# Patient Record
Sex: MALE | Race: WHITE | NOT HISPANIC OR LATINO | Employment: FULL TIME | URBAN - METROPOLITAN AREA
[De-identification: names, ages, dates, MRNs, and addresses within clinical notes are randomized per-mention and may not be internally consistent; named-entity substitution may affect disease eponyms.]

---

## 2021-08-24 ENCOUNTER — OFFICE VISIT (OUTPATIENT)
Dept: URGENT CARE | Facility: URGENT CARE | Age: 31
End: 2021-08-24
Payer: OTHER GOVERNMENT

## 2021-08-24 VITALS
SYSTOLIC BLOOD PRESSURE: 125 MMHG | DIASTOLIC BLOOD PRESSURE: 81 MMHG | TEMPERATURE: 97.6 F | HEART RATE: 88 BPM | OXYGEN SATURATION: 99 %

## 2021-08-24 DIAGNOSIS — T63.481A ALLERGIC REACTION TO INSECT STING, ACCIDENTAL OR UNINTENTIONAL, INITIAL ENCOUNTER: Primary | ICD-10-CM

## 2021-08-24 PROCEDURE — 96372 THER/PROPH/DIAG INJ SC/IM: CPT | Performed by: INTERNAL MEDICINE

## 2021-08-24 PROCEDURE — 99205 OFFICE O/P NEW HI 60 MIN: CPT | Performed by: INTERNAL MEDICINE

## 2021-08-24 RX ORDER — EPINEPHRINE 0.3 MG/.3ML
0.3 INJECTION SUBCUTANEOUS ONCE
Status: COMPLETED | OUTPATIENT
Start: 2021-08-24 | End: 2021-08-24

## 2021-08-24 RX ORDER — EPINEPHRINE 0.3 MG/.3ML
0.3 INJECTION SUBCUTANEOUS ONCE
Qty: 0.3 ML | Refills: 0 | Status: SHIPPED | OUTPATIENT
Start: 2021-08-24 | End: 2021-08-24

## 2021-08-24 RX ORDER — DIPHENHYDRAMINE HCL 25 MG
25 CAPSULE ORAL ONCE
Status: COMPLETED | OUTPATIENT
Start: 2021-08-24 | End: 2021-08-24

## 2021-08-24 RX ADMIN — EPINEPHRINE 0.3 MG: 0.3 INJECTION SUBCUTANEOUS at 17:13

## 2021-08-24 RX ADMIN — Medication 25 MG: at 17:13

## 2021-08-24 NOTE — PROGRESS NOTES
SUBJECTIVE:  Varghese Brothers is an 31 year old male who presents for bee sting.  Not sure exactly what stung him but thinks was a bee or wasp or hornet.  Sting occurred about 30 min ago.  Hurt at site of sting.   Has become red around the area of the sting on the left foot.  Has some swelling around the sting as well.  Feels some tightness in joints for a couple minutes .   Hasn't noticed any rashes on skin other than redness around the sting site.  Feels some itching but no rashes or hives.  No swelling of lips, tongue.  Throat feels tight and harder to swallow.  No nausea.  No meds taken for sxs.  In past has had hives after a sting from bee/hornet/wasp a couple times, maybe more.   No wheezing or shortness of breath.  Does feel that his throat is tight or irritated and it feels a little different or even difficult to swallow.    PMH:  neg    Social History     Socioeconomic History     Marital status:      Spouse name: Not on file     Number of children: Not on file     Years of education: Not on file     Highest education level: Not on file   Occupational History     Not on file   Tobacco Use     Smoking status: Not on file   Substance and Sexual Activity     Alcohol use: Not on file     Drug use: Not on file     Sexual activity: Not on file   Other Topics Concern     Not on file   Social History Narrative     Not on file     Social Determinants of Health     Financial Resource Strain:      Difficulty of Paying Living Expenses:    Food Insecurity:      Worried About Running Out of Food in the Last Year:      Ran Out of Food in the Last Year:    Transportation Needs:      Lack of Transportation (Medical):      Lack of Transportation (Non-Medical):    Physical Activity:      Days of Exercise per Week:      Minutes of Exercise per Session:    Stress:      Feeling of Stress :    Social Connections:      Frequency of Communication with Friends and Family:      Frequency of Social Gatherings with Friends and  Family:      Attends [employer] Services:      Active Member of Clubs or Organizations:      Attends Club or Organization Meetings:      Marital Status:    Intimate Partner Violence:      Fear of Current or Ex-Partner:      Emotionally Abused:      Physically Abused:      Sexually Abused:      No family history on file.    ALLERGIES:  Patient has no known allergies.    No current outpatient medications on file.     No current facility-administered medications for this visit.         ROS:  ROS is done and is negative for general/constitutional, eye, ENT, Respiratory, cardiovascular, GI, , Skin, musculoskeletal except as noted elsewhere.  All other review of systems negative except as noted elsewhere.      OBJECTIVE:  /81   Pulse 72   Temp 97.6  F (36.4  C) (Oral)   SpO2 99%   GENERAL APPEARANCE: Alert, in no acute distress  EYES: normal  EARS: External ears normal. Canals clear. TM's normal.  NOSE:normal  OROPHARYNX:normal, no erythema or edema  NECK:No adenopathy,masses or thyromegaly  RESP: normal and clear to auscultation  CV:regular rate and rhythm and no murmurs, clicks, or gallops  ABDOMEN: Abdomen soft, non-tender. BS normal. No masses, organomegaly  SKIN: left foot - top of foot with irregular area approx 9 cm area of mild erythema and mild edema with pinpoint opening in skin near center, c/w sting  MUSCULOSKELETAL:Musculoskeletal normal      TREATMENT:  Pt received one dose of epi-pen and 25mg oral benadryl at approx 5:15 pm.  Within 15 minutes he noticed improvement in his throat symptoms with decreased sense of irritation.  Monitored over the next 2 hours and his throat sxs fully resolved and swallowing was not troublesome to him any more.  Skin itching improved as well.  After 2 hours of monitoring, no evidence of rebound sxs noted    ASSESSMENT/PLAN:    ASSESSMENT / PLAN:  (T63.460D) Allergic reaction to insect sting, accidental or unintentional, initial encounter  (primary encounter  diagnosis)  Comment: had two systems involved with reaction, so criteria met for anaphylaxis which can become life threatening if not treated.  Pt received epipen here quickly.  pt improved with epi pen and benadryl.  Monitored him for 2 hours and I frequently checked on him during this time.  No rebound sxs detected.  Plan: EPINEPHrine (ANY BX GENERIC EQUIV) injection         0.3 mg, diphenhydrAMINE (BENADRYL) capsule 25         mg, EPINEPHrine (ANY BX GENERIC EQUIV) 0.3         MG/0.3ML injection 2-pack        Pt sent home with epipen rx and has been instructed on when and how to use if for sxs.  If uses it is to go to ER, whether has rebound sxs tonight or future sxs from stings.  Reviewed medication instructions and side effects. Follow up if experiences side effects.. I reviewed supportive care, otc meds to use if needed, expected course, and signs of concern.  Follow up as needed or if he does not improve within 1 day(s) or if worsens in any way.  Reviewed red flag symptoms and is to go to the ER if experiences any of these.      PPE worn: mask and shield.  Total time I spent with pt and monitoring pt and reviewing records was 80 minutes  See [facility] for orders, medications, letters, patient instructions    Araceli Coker M.D.

## 2021-10-17 ENCOUNTER — HEALTH MAINTENANCE LETTER (OUTPATIENT)
Age: 31
End: 2021-10-17

## 2022-07-19 ASSESSMENT — ENCOUNTER SYMPTOMS
WEAKNESS: 1
MYALGIAS: 0
FREQUENCY: 0
ARTHRALGIAS: 1
HEADACHES: 0
FEVER: 0
CHILLS: 0
SORE THROAT: 0
NAUSEA: 0
EYE PAIN: 0
HEARTBURN: 0
COUGH: 0
JOINT SWELLING: 0
NERVOUS/ANXIOUS: 0
HEMATOCHEZIA: 0
DIARRHEA: 0
PALPITATIONS: 1
HEMATURIA: 0
DYSURIA: 0
CONSTIPATION: 0
SHORTNESS OF BREATH: 1
PARESTHESIAS: 0
DIZZINESS: 0
ABDOMINAL PAIN: 0

## 2022-07-19 NOTE — PATIENT INSTRUCTIONS
Preventive Health Recommendations  Male Ages 26 - 39    Yearly exam:             See your health care provider every year in order to  o   Review health changes.   o   Discuss preventive care.    o   Review your medicines if your doctor has prescribed any.    You should be tested each year for STDs (sexually transmitted diseases), if you re at risk.     After age 35, talk to your provider about cholesterol testing. If you are at risk for heart disease, have your cholesterol tested at least every 5 years.     If you are at risk for diabetes, you should have a diabetes test (fasting glucose).  Shots: Get a flu shot each year. Get a tetanus shot every 10 years.     Nutrition:    Eat at least 5 servings of fruits and vegetables daily.     Eat whole-grain bread, whole-wheat pasta and brown rice instead of white grains and rice.     Get adequate Calcium and Vitamin D.     Lifestyle    Exercise for at least 150 minutes a week (30 minutes a day, 5 days a week). This will help you control your weight and prevent disease.     Limit alcohol to one drink per day.     No smoking.     Wear sunscreen to prevent skin cancer.     See your dentist every six months for an exam and cleaning.      no

## 2022-07-19 NOTE — PROGRESS NOTES
SUBJECTIVE:   CC: Varghese Brothers is an 31 year old male who presents for preventative health visit.   Dad with MI at 53yo - passed 2 years ago.   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 Dad - not known to have htn/obesity/dm - was healthy.   Paternal uncles/grandpa alive.   Non-smoker. Exercise - active - if pushing too hard - some chest pain. Past 6 months - every since second covid shot. Has covid in [facility] - did ok.   No history asthma. Occasionally cough with dry weather.   No gerd symptoms. No nausea, vomiting or diarrhea or black/bloody stools. No urine changes or hematuria. Coffee in 2 cups. Occasionally coke. Stopped ALCOHOL. No illicit drugs.   Good support system.  excepted boy soon and 2.5 year old daughter.  No rashes or moles changes. No std. Eye exam 2019. Dentist should. No testicle pain/masses/hernia.     - doing ok.   Oat milk. Balanced diet.   Patient has smart watch.     Patient has been advised of split billing requirements and indicates understanding: Yes  Healthy Habits:     Getting at least 3 servings of Calcium per day:  Yes    Bi-annual eye exam:  NO    Dental care twice a year:  NO    Sleep apnea or symptoms of sleep apnea:  None    Diet:  Regular (no restrictions)    Frequency of exercise:  1 day/week    Duration of exercise:  Less than 15 minutes    Taking medications regularly:  Not Applicable    Medication side effects:  Not applicable    PHQ-2 Total Score: 0    Additional concerns today:  Yes        Today's PHQ-2 Score:   PHQ-2 ( 1999 Pfizer) 7/19/2022   Q1: Little interest or pleasure in doing things 0   Q2: Feeling down, depressed or hopeless 0   PHQ-2 Score 0   Q1: Little interest or pleasure in doing things Not at all   Q2: Feeling down, depressed or hopeless Not at all   PHQ-2 Score 0       Abuse: Current or Past(Physical, Sexual or Emotional)- No  Do you feel safe in your environment? Yes      Social History     Tobacco Use     Smoking status: Not on file      "Smokeless tobacco: Not on file   Substance Use Topics     Alcohol use: Not on file     If you drink alcohol do you typically have >3 drinks per day or >7 drinks per week? No    Alcohol Use 7/19/2022   Prescreen: >3 drinks/day or >7 drinks/week? Not Applicable       Last PSA: No results found for: PSA    Reviewed orders with patient. Reviewed health maintenance and updated orders accordingly - Yes  Lab work is in process    Reviewed and updated as needed this visit by clinical staff                    Reviewed and updated as needed this visit by Provider                       Review of Systems   Constitutional: Negative for chills and fever.   HENT: Negative for congestion, ear pain, hearing loss and sore throat.    Eyes: Negative for pain and visual disturbance.   Respiratory: Positive for shortness of breath. Negative for cough.    Cardiovascular: Positive for chest pain and palpitations. Negative for peripheral edema.   Gastrointestinal: Negative for abdominal pain, constipation, diarrhea, heartburn, hematochezia and nausea.   Genitourinary: Negative for dysuria, frequency, genital sores, hematuria, impotence, penile discharge and urgency.   Musculoskeletal: Positive for arthralgias. Negative for joint swelling and myalgias.   Skin: Negative for rash.   Neurological: Positive for weakness. Negative for dizziness, headaches and paresthesias.   Psychiatric/Behavioral: Negative for mood changes. The patient is not nervous/anxious.      All other ROS negative    OBJECTIVE:   BP (!) 159/91   Pulse 100   Temp 98.4  F (36.9  C) (Tympanic)   Resp 16   Ht 1.689 m (5' 6.5\")   Wt 72.6 kg (160 lb)   SpO2 99%   BMI 25.44 kg/m     Physical Exam  GENERAL: healthy, alert and no distress  EYES: Eyes grossly normal to inspection, PERRL and conjunctivae and sclerae normal  HENT: ear canals and TM's normal, nose and mouth without ulcers or lesions  NECK: no adenopathy, no asymmetry, masses, or scars and thyroid normal to " palpation  RESP: lungs clear to auscultation - no rales, rhonchi or wheezes  BREAST: normal without masses, tenderness or nipple discharge and no palpable axillary masses or adenopathy  CV: regular rate and rhythm, normal S1 S2, no S3 or S4, no murmur, click or rub, no peripheral edema and peripheral pulses strong  ABDOMEN: soft, nontender, no hepatosplenomegaly, no masses and bowel sounds normal   (male): patient deferred /rectal exams.   MS: no gross musculoskeletal defects noted, no edema  SKIN: no suspicious lesions or rashes  NEURO: Normal strength and tone, mentation intact and speech normal  PSYCH: mentation appears normal, affect normal/bright  PSYCH: mildly anxious  LYMPH: no cervical, supraclavicular, axillary, or inguinal adenopathy    ASSESSMENT/PLAN:   ASSESSMENT / PLAN:  (Z00.00) Routine general medical examination at a health care facility  (primary encounter diagnosis)  Comment: generally healthy and normal exam  Plan: CBC with platelets, Comprehensive metabolic         panel        Await future fasting labs. Reviewed self mole/testicle check handout.  VitaminD.     (Z13.6) CARDIOVASCULAR SCREENING; LDL GOAL LESS THAN 130    Plan: Lipid Profile        Consider statin vs fish oil.     (R03.0) Elevated blood pressure reading without diagnosis of hypertension  Comment: possible anxiety/ white coat  Plan: lower sodium and self-monitor. Start norvasc or metoprolol if worse.     (Z82.49) FHx: early coronary artery disease  Comment: dad  Plan: consider calcium scan at 50 or cardilogy consult. Check stress echo/lipids. Non-smoker and monitor blood pressure.    (R07.89) Atypical chest pain  Comment: stress vs ? Patient believed started after 2nd covid vaccine in winter.   Plan: Echocardiogram Exercise Stress        Consider lung testing if persists. gerd ? Anxiety? To ER if acutely worsening chest pain / shortness of breath especially at rest. Continue monitor smart watch. Call/email with  questions/concerns     Patient has been advised of split billing requirements and indicates understanding: Yes    COUNSELING:   Reviewed preventive health counseling, as reflected in patient instructions       Regular exercise       Healthy diet/nutrition       Vision screening       Alcohol Use        Safe sex practices/STD prevention       Osteoporosis prevention/bone health    There is no height or weight on file to calculate BMI.         He has no history on file for tobacco use.      Counseling Resources:  ATP IV Guidelines  Pooled Cohorts Equation Calculator  FRAX Risk Assessment  ICSI Preventive Guidelines  Dietary Guidelines for Americans, 2010  USDA's MyPlate  ASA Prophylaxis  Lung CA Screening    Cesar Finney MD  [facility]

## 2022-07-26 ENCOUNTER — OFFICE VISIT (OUTPATIENT)
Dept: FAMILY MEDICINE | Facility: CLINIC | Age: 32
End: 2022-07-26
Payer: OTHER GOVERNMENT

## 2022-07-26 VITALS
HEART RATE: 100 BPM | SYSTOLIC BLOOD PRESSURE: 159 MMHG | BODY MASS INDEX: 25.11 KG/M2 | WEIGHT: 160 LBS | OXYGEN SATURATION: 99 % | HEIGHT: 67 IN | RESPIRATION RATE: 16 BRPM | DIASTOLIC BLOOD PRESSURE: 91 MMHG | TEMPERATURE: 98.4 F

## 2022-07-26 DIAGNOSIS — R03.0 ELEVATED BLOOD PRESSURE READING WITHOUT DIAGNOSIS OF HYPERTENSION: ICD-10-CM

## 2022-07-26 DIAGNOSIS — Z00.00 ROUTINE GENERAL MEDICAL EXAMINATION AT A HEALTH CARE FACILITY: Primary | ICD-10-CM

## 2022-07-26 DIAGNOSIS — R07.89 ATYPICAL CHEST PAIN: ICD-10-CM

## 2022-07-26 DIAGNOSIS — Z13.6 CARDIOVASCULAR SCREENING; LDL GOAL LESS THAN 130: ICD-10-CM

## 2022-07-26 DIAGNOSIS — Z82.49 FHX: EARLY CORONARY ARTERY DISEASE: ICD-10-CM

## 2022-07-26 PROCEDURE — 99213 OFFICE O/P EST LOW 20 MIN: CPT | Mod: 25 | Performed by: FAMILY MEDICINE

## 2022-07-26 PROCEDURE — 99395 PREV VISIT EST AGE 18-39: CPT | Performed by: FAMILY MEDICINE

## 2022-07-26 RX ORDER — EPINEPHRINE 0.3 MG/.3ML
INJECTION SUBCUTANEOUS
COMMUNITY
Start: 2021-08-24

## 2022-07-26 ASSESSMENT — PAIN SCALES - GENERAL: PAINLEVEL: NO PAIN (0)

## 2022-08-11 ENCOUNTER — LAB (OUTPATIENT)
Dept: LAB | Facility: CLINIC | Age: 32
End: 2022-08-11
Payer: OTHER GOVERNMENT

## 2022-08-11 DIAGNOSIS — Z13.6 CARDIOVASCULAR SCREENING; LDL GOAL LESS THAN 130: ICD-10-CM

## 2022-08-11 DIAGNOSIS — Z00.00 ROUTINE GENERAL MEDICAL EXAMINATION AT A HEALTH CARE FACILITY: ICD-10-CM

## 2022-08-11 LAB
ALBUMIN SERPL-MCNC: 4.3 G/DL (ref 3.4–5)
ALP SERPL-CCNC: 54 U/L (ref 40–150)
ALT SERPL W P-5'-P-CCNC: 37 U/L (ref 0–70)
ANION GAP SERPL CALCULATED.3IONS-SCNC: 3 MMOL/L (ref 3–14)
AST SERPL W P-5'-P-CCNC: 20 U/L (ref 0–45)
BILIRUB SERPL-MCNC: 0.7 MG/DL (ref 0.2–1.3)
BUN SERPL-MCNC: 19 MG/DL (ref 7–30)
CALCIUM SERPL-MCNC: 9.3 MG/DL (ref 8.5–10.1)
CHLORIDE BLD-SCNC: 107 MMOL/L (ref 94–109)
CHOLEST SERPL-MCNC: 229 MG/DL
CO2 SERPL-SCNC: 29 MMOL/L (ref 20–32)
CREAT SERPL-MCNC: 1.18 MG/DL (ref 0.66–1.25)
ERYTHROCYTE [DISTWIDTH] IN BLOOD BY AUTOMATED COUNT: 12.4 % (ref 10–15)
FASTING STATUS PATIENT QL REPORTED: YES
GFR SERPL CREATININE-BSD FRML MDRD: 85 ML/MIN/1.73M2
GLUCOSE BLD-MCNC: 90 MG/DL (ref 70–99)
HCT VFR BLD AUTO: 45.2 % (ref 40–53)
HDLC SERPL-MCNC: 53 MG/DL
HGB BLD-MCNC: 15.8 G/DL (ref 13.3–17.7)
LDLC SERPL CALC-MCNC: 156 MG/DL
MCH RBC QN AUTO: 29.2 PG (ref 26.5–33)
MCHC RBC AUTO-ENTMCNC: 35 G/DL (ref 31.5–36.5)
MCV RBC AUTO: 84 FL (ref 78–100)
NONHDLC SERPL-MCNC: 176 MG/DL
PLATELET # BLD AUTO: 155 10E3/UL (ref 150–450)
POTASSIUM BLD-SCNC: 3.8 MMOL/L (ref 3.4–5.3)
PROT SERPL-MCNC: 7.3 G/DL (ref 6.8–8.8)
RBC # BLD AUTO: 5.41 10E6/UL (ref 4.4–5.9)
SODIUM SERPL-SCNC: 139 MMOL/L (ref 133–144)
TRIGL SERPL-MCNC: 100 MG/DL
WBC # BLD AUTO: 4.3 10E3/UL (ref 4–11)

## 2022-08-11 PROCEDURE — 80053 COMPREHEN METABOLIC PANEL: CPT

## 2022-08-11 PROCEDURE — 80061 LIPID PANEL: CPT

## 2022-08-11 PROCEDURE — 36415 COLL VENOUS BLD VENIPUNCTURE: CPT

## 2022-08-11 PROCEDURE — 85027 COMPLETE CBC AUTOMATED: CPT

## 2022-08-19 ENCOUNTER — ANCILLARY PROCEDURE (OUTPATIENT)
Dept: CARDIOLOGY | Facility: CLINIC | Age: 32
End: 2022-08-19
Attending: FAMILY MEDICINE
Payer: OTHER GOVERNMENT

## 2022-08-19 DIAGNOSIS — R07.89 ATYPICAL CHEST PAIN: ICD-10-CM

## 2022-08-19 PROCEDURE — 93018 CV STRESS TEST I&R ONLY: CPT | Performed by: INTERNAL MEDICINE

## 2022-08-19 PROCEDURE — 93350 STRESS TTE ONLY: CPT | Performed by: INTERNAL MEDICINE

## 2022-08-19 PROCEDURE — 93325 DOPPLER ECHO COLOR FLOW MAPG: CPT | Performed by: INTERNAL MEDICINE

## 2022-08-19 PROCEDURE — 93017 CV STRESS TEST TRACING ONLY: CPT | Performed by: INTERNAL MEDICINE

## 2022-08-19 PROCEDURE — 93016 CV STRESS TEST SUPVJ ONLY: CPT | Performed by: INTERNAL MEDICINE

## 2022-08-19 PROCEDURE — 93321 DOPPLER ECHO F-UP/LMTD STD: CPT | Performed by: INTERNAL MEDICINE

## 2022-08-19 RX ADMIN — Medication 7 ML: at 15:17

## 2022-08-31 ENCOUNTER — MYC MEDICAL ADVICE (OUTPATIENT)
Dept: FAMILY MEDICINE | Facility: CLINIC | Age: 32
End: 2022-08-31

## 2022-08-31 NOTE — TELEPHONE ENCOUNTER
I can't promise the note will exempt patient from covid vaccine useless severe reaction. I can say patient had chest pains since last covid shot but don't know if that's enough. Can also check for covid antibiotics than might help. Would need evisit for letter. Cesar Finney MD

## 2022-08-31 NOTE — TELEPHONE ENCOUNTER
Patient wondering if he can get a note that he is exempt from receiving the covid vaccine.   His employer requires it.   E-visit?  Video visit to discuss?      Bonnie Gold BSN, RN

## 2023-09-07 ENCOUNTER — E-VISIT (OUTPATIENT)
Dept: URGENT CARE | Facility: CLINIC | Age: 33
End: 2023-09-07
Payer: OTHER GOVERNMENT

## 2023-09-07 DIAGNOSIS — B02.9 HERPES ZOSTER WITHOUT COMPLICATION: Primary | ICD-10-CM

## 2023-09-07 PROCEDURE — 99421 OL DIG E/M SVC 5-10 MIN: CPT | Performed by: PREVENTIVE MEDICINE

## 2023-09-07 RX ORDER — VALACYCLOVIR HYDROCHLORIDE 1 G/1
1000 TABLET, FILM COATED ORAL 3 TIMES DAILY
Qty: 21 TABLET | Refills: 0 | Status: SHIPPED | OUTPATIENT
Start: 2023-09-07 | End: 2024-01-08

## 2023-09-07 NOTE — PATIENT INSTRUCTIONS
Dear Varghese Brothers    This is shingles, a recurrence of the chicken pox viruis that occurs in adults.  Take valtrex (antiviral) for 7 days; I have prescribed this for you.  Take tylenol 500 mg three times per day as needed.  Follow up in person with primary care in one week for a recheck or sooner as needed.      Thanks for choosing us as your health care partner,    Kostas Viramontes MD

## 2023-10-21 ENCOUNTER — HEALTH MAINTENANCE LETTER (OUTPATIENT)
Age: 33
End: 2023-10-21

## 2024-01-03 ENCOUNTER — E-VISIT (OUTPATIENT)
Dept: URGENT CARE | Facility: URGENT CARE | Age: 34
End: 2024-01-03
Payer: OTHER GOVERNMENT

## 2024-01-03 DIAGNOSIS — H10.31 ACUTE BACTERIAL CONJUNCTIVITIS OF RIGHT EYE: Primary | ICD-10-CM

## 2024-01-03 PROCEDURE — 99421 OL DIG E/M SVC 5-10 MIN: CPT | Performed by: PHYSICIAN ASSISTANT

## 2024-01-03 RX ORDER — POLYMYXIN B SULFATE AND TRIMETHOPRIM 1; 10000 MG/ML; [USP'U]/ML
SOLUTION OPHTHALMIC
Qty: 10 ML | Refills: 0 | Status: SHIPPED | OUTPATIENT
Start: 2024-01-03 | End: 2024-01-10

## 2024-01-04 NOTE — PATIENT INSTRUCTIONS
Thank you for choosing us for your care. I have placed an order for a prescription so that you can start treatment. View your full visit summary for details by clicking on the link below. Your pharmacist will able to address any questions you may have about the medication.     If you re not feeling better within 2-3 days, please schedule an appointment.  You can schedule an appointment right here in [facility], or call [phone]  If the visit is for the same symptoms as your eVisit, we ll refund the cost of your eVisit if seen within seven days.    Pinkeye: Care Instructions  Overview     Pinkeye is redness and swelling of the eye surface and the conjunctiva (the lining of the eyelid and the covering of the white part of the eye). Pinkeye is also called conjunctivitis. Pinkeye is often caused by infection with bacteria or a virus. Dry air, allergies, smoke, and chemicals are other common causes.  Pinkeye often gets better on its own in 7 to 10 days. Antibiotics only help if the pinkeye is caused by bacteria. Pinkeye caused by infection spreads easily. If an allergy or chemical is causing pinkeye, it will not go away unless you can avoid whatever is causing it.  Follow-up care is a key part of your treatment and safety. Be sure to make and go to all appointments, and call your doctor if you are having problems. It's also a good idea to know your test results and keep a list of the medicines you take.  How can you care for yourself at home?  Wash your hands often. Always wash them before and after you treat pinkeye or touch your eyes or face.  Use moist cotton or a clean, wet cloth to remove crust. Wipe from the inside corner of the eye to the outside. Use a clean part of the cloth for each wipe.  Put cold or warm wet cloths on your eye a few times a day if the eye hurts.  Do not wear contact lenses or eye makeup until the pinkeye is gone. Throw away any eye makeup you were using when you got pinkeye. Clean your  "contacts and storage case. If you wear disposable contacts, use a new pair when your eye has cleared and it is safe to wear contacts again.  If the doctor gave you antibiotic ointment or eyedrops, use them as directed. Use the medicine for as long as instructed, even if your eye starts looking better soon. Keep the bottle tip clean, and do not let it touch the eye area.  To put in eyedrops or ointment:  Tilt your head back, and pull your lower eyelid down with one finger.  Drop or squirt the medicine inside the lower lid.  Close your eye for 30 to 60 seconds to let the drops or ointment move around.  Do not touch the ointment or dropper tip to your eyelashes or any other surface.  Do not share towels, pillows, or washcloths while you have pinkeye.  When should you call for help?   Call your doctor now or seek immediate medical care if:    You have pain in your eye, not just irritation on the surface.     You have a change in vision or loss of vision.     You have an increase in discharge from the eye.     Your eye has not started to improve or begins to get worse within 48 hours after you start using antibiotics.     Pinkeye lasts longer than 7 days.   Watch closely for changes in your health, and be sure to contact your doctor if you have any problems.  Where can you learn more?  Go to https://www.[employer].net/patiented  Enter Y392 in the search box to learn more about \"Pinkeye: Care Instructions.\"  Current as of: June 6, 2023               Content Version: 13.8    [id number] [employer].   Care instructions adapted under license by your healthcare professional. If you have questions about a medical condition or this instruction, always ask your healthcare professional. [employer] disclaims any warranty or liability for your use of this information.      "

## 2024-01-08 ENCOUNTER — OFFICE VISIT (OUTPATIENT)
Dept: FAMILY MEDICINE | Facility: CLINIC | Age: 34
End: 2024-01-08
Payer: OTHER GOVERNMENT

## 2024-01-08 VITALS
TEMPERATURE: 99.4 F | WEIGHT: 157 LBS | RESPIRATION RATE: 16 BRPM | SYSTOLIC BLOOD PRESSURE: 132 MMHG | DIASTOLIC BLOOD PRESSURE: 90 MMHG | OXYGEN SATURATION: 98 % | HEIGHT: 66 IN | BODY MASS INDEX: 25.23 KG/M2 | HEART RATE: 88 BPM

## 2024-01-08 DIAGNOSIS — J06.9 UPPER RESPIRATORY TRACT INFECTION, UNSPECIFIED TYPE: ICD-10-CM

## 2024-01-08 DIAGNOSIS — I10 HYPERTENSION GOAL BP (BLOOD PRESSURE) < 140/90: ICD-10-CM

## 2024-01-08 DIAGNOSIS — R09.81 CHRONIC NASAL CONGESTION: ICD-10-CM

## 2024-01-08 DIAGNOSIS — H10.33 ACUTE CONJUNCTIVITIS OF BOTH EYES, UNSPECIFIED ACUTE CONJUNCTIVITIS TYPE: Primary | ICD-10-CM

## 2024-01-08 LAB
DEPRECATED S PYO AG THROAT QL EIA: NEGATIVE
GROUP A STREP BY PCR: NOT DETECTED

## 2024-01-08 PROCEDURE — 99213 OFFICE O/P EST LOW 20 MIN: CPT | Performed by: FAMILY MEDICINE

## 2024-01-08 PROCEDURE — 87651 STREP A DNA AMP PROBE: CPT | Performed by: FAMILY MEDICINE

## 2024-01-08 RX ORDER — FLUTICASONE PROPIONATE 50 MCG
1 SPRAY, SUSPENSION (ML) NASAL DAILY
Qty: 16 G | Refills: 11 | Status: SHIPPED | OUTPATIENT
Start: 2024-01-08

## 2024-01-08 NOTE — PATIENT INSTRUCTIONS
Did you know you can lower your blood pressure with your daily habits?    *Losing 20 pounds of weight lowers blood pressure 5 to 20 points.  *Eating a low-fat diet rich in fruits, vegetables and low-fat dairy lowers blood pressure 8 to 14 points.  *Eating a low-salt diet lowers blood pressure 2 to 8 points.  *Exercising regularly lowers blood pressure 4 to 9 points.  *Reducing alcohol use lowers blood pressure 2 to 4 points.

## 2024-01-08 NOTE — PROGRESS NOTES
"  Assessment & Plan     (H10.33) Acute conjunctivitis of both eyes, unspecified acute conjunctivitis type  (primary encounter diagnosis)  (J06.9) Upper respiratory tract infection, unspecified type  (R09.81) Chronic nasal congestion  Comment: Day #3 of illness with sore throat and persistent conjunctivitis   Plan: fluticasone (FLONASE) 50 MCG/ACT nasal spray        Okay to continue eye drops as prescribed; Symptomatic care.  Return to clinic for persistence, recurrence or new symptoms.     (I10) Hypertension goal BP (blood pressure) < 140/90  Comment: new diagnosis   Plan: follow-up recommended              BMI:   Estimated body mass index is 25.08 kg/m  as calculated from the following:    Height as of this encounter: 1.685 m (5' 6.34\").    Weight as of this encounter: 71.2 kg (157 lb).   Weight management plan: Discussed healthy diet and exercise guidelines    See Patient Instructions    Jennifer Parkinson MD  [facility] QUINTIN Kwong is a 33 year old, presenting for the following health issues:  RECHECK (Urgent Care pink eye 1/3/24 [place]) and Pharyngitis        1/8/2024     3:41 PM   Additional Questions   Roomed by Angela MOYER CMA   Accompanied by Self       History of Present Illness       Reason for visit:  Sore throat and pinkeye    He eats 2-3 servings of fruits and vegetables daily.He consumes 0 sweetened beverage(s) daily.He exercises with enough effort to increase his heart rate 10 to 19 minutes per day.  He exercises with enough effort to increase his heart rate 3 or less days per week.   He is taking medications regularly.       Pre-Provider Visit Orders - Rapid Strep  Does the patient have shortness of breath/trouble breathing, an earache, drooling/too much saliva, or any difficulty opening his mouth/moving neck?  No  Does the patient have a sore throat and either history of fever >100.4 in the previous 24 hours without a cough or recent exposure to a known case of strep " "throat? Yes          The patient complains of typical URI symptoms; coryza, nasal stuffiness, congestion, postnasal drip, sore throat, malaise, and pink eye despite use of eye drop. Endorses chronic nasal congestion off and on. Exposed to children with similar symptoms. No fever.     ROS  CONSTITUTIONAL: NEGATIVE for fever, chills, change in weight  INTEGUMENTARY/SKIN: NEGATIVE for worrisome rashes, moles or lesions  EYES: mattering bilateral and redness bilateral  ENT/MOUTH: hoarse voice, nasal congestion, and sore throat  RESP: NEGATIVE for significant cough or SOB      Objective    BP (!) 132/90 (BP Location: Right arm, Patient Position: Sitting, Cuff Size: Adult Regular)   Pulse 88   Temp 99.4  F (37.4  C) (Oral)   Resp 16   Ht 1.685 m (5' 6.34\")   Wt 71.2 kg (157 lb)   SpO2 98%   BMI 25.08 kg/m    Body mass index is 25.08 kg/m .  Physical Exam   GENERAL: healthy, alert and no distress  EYES: PERRL, EOMI, and conjunctiva/corneas- conjunctival injection OU  HENT: ear canals and TM's normal, nose and mouth without ulcers or lesions  NECK: no adenopathy, no asymmetry, masses, or scars and thyroid normal to palpation  RESP: lungs clear to auscultation - no rales, rhonchi or wheezes  CV: regular rate and rhythm, normal S1 S2, no S3 or S4, no murmur, click or rub, no peripheral edema    MS: extremities normal- no gross deformities noted  PSYCH: mentation appears normal, affect normal/bright    Lab on 08/11/2022   Component Date Value Ref Range Status    WBC Count 08/11/2022 4.3  4.0 - 11.0 10e3/uL Final    RBC Count 08/11/2022 5.41  4.40 - 5.90 10e6/uL Final    Hemoglobin 08/11/2022 15.8  13.3 - 17.7 g/dL Final    Hematocrit 08/11/2022 45.2  40.0 - 53.0 % Final    MCV 08/11/2022 84  78 - 100 fL Final    MCH 08/11/2022 29.2  26.5 - 33.0 pg Final    MCHC 08/11/2022 35.0  31.5 - 36.5 g/dL Final    RDW 08/11/2022 12.4  10.0 - 15.0 % Final    Platelet Count 08/11/2022 155  150 - 450 10e3/uL Final    Sodium " 08/11/2022 139  133 - 144 mmol/L Final    Potassium 08/11/2022 3.8  3.4 - 5.3 mmol/L Final    Chloride 08/11/2022 107  94 - 109 mmol/L Final    Carbon Dioxide (CO2) 08/11/2022 29  20 - 32 mmol/L Final    Anion Gap 08/11/2022 3  3 - 14 mmol/L Final    Urea Nitrogen 08/11/2022 19  7 - 30 mg/dL Final    Creatinine 08/11/2022 1.18  0.66 - 1.25 mg/dL Final    Calcium 08/11/2022 9.3  8.5 - 10.1 mg/dL Final    Glucose 08/11/2022 90  70 - 99 mg/dL Final    Alkaline Phosphatase 08/11/2022 54  40 - 150 U/L Final    AST 08/11/2022 20  0 - 45 U/L Final    ALT 08/11/2022 37  0 - 70 U/L Final    Protein Total 08/11/2022 7.3  6.8 - 8.8 g/dL Final    Albumin 08/11/2022 4.3  3.4 - 5.0 g/dL Final    Bilirubin Total 08/11/2022 0.7  0.2 - 1.3 mg/dL Final    GFR Estimate 08/11/2022 85  >60 mL/min/1.73m2 Final    Effective December 21, 2021 eGFRcr in adults is calculated using the 2021 CKD-EPI creatinine equation which includes age and gender (Alisa et al., NEJ, DOI: 10.1056/CDHPnv0643872)    Cholesterol 08/11/2022 229 (H)  <200 mg/dL Final    Triglycerides 08/11/2022 100  <150 mg/dL Final    Direct Measure HDL 08/11/2022 53  >=40 mg/dL Final    LDL Cholesterol Calculated 08/11/2022 156 (H)  <=100 mg/dL Final    Non HDL Cholesterol 08/11/2022 176 (H)  <130 mg/dL Final    Patient Fasting > 8hrs? 08/11/2022 Yes   Final     Results for orders placed or performed in visit on 01/08/24 (from the past 24 hour(s))   Streptococcus A Rapid Screen w/Reflex to PCR - Clinic Collect    Specimen: Throat; Swab   Result Value Ref Range    Group A Strep antigen Negative Negative

## 2024-01-19 ASSESSMENT — ENCOUNTER SYMPTOMS
MYALGIAS: 0
HEMATURIA: 0
DYSURIA: 0
EYE PAIN: 0
HEMATOCHEZIA: 0
SORE THROAT: 0
DIZZINESS: 0
PARESTHESIAS: 0
WEAKNESS: 0
NAUSEA: 0
FEVER: 0
COUGH: 0
CONSTIPATION: 0
FREQUENCY: 0
SHORTNESS OF BREATH: 0
PALPITATIONS: 0
CHILLS: 0
ABDOMINAL PAIN: 0
JOINT SWELLING: 0
ARTHRALGIAS: 0
HEADACHES: 0
HEARTBURN: 0
NERVOUS/ANXIOUS: 0
DIARRHEA: 0

## 2024-01-26 ENCOUNTER — OFFICE VISIT (OUTPATIENT)
Dept: FAMILY MEDICINE | Facility: CLINIC | Age: 34
End: 2024-01-26
Payer: OTHER GOVERNMENT

## 2024-01-26 VITALS
BODY MASS INDEX: 23.86 KG/M2 | SYSTOLIC BLOOD PRESSURE: 138 MMHG | DIASTOLIC BLOOD PRESSURE: 72 MMHG | OXYGEN SATURATION: 100 % | TEMPERATURE: 97.9 F | RESPIRATION RATE: 18 BRPM | HEART RATE: 77 BPM | WEIGHT: 152 LBS | HEIGHT: 67 IN

## 2024-01-26 DIAGNOSIS — Z00.00 ROUTINE GENERAL MEDICAL EXAMINATION AT A HEALTH CARE FACILITY: Primary | ICD-10-CM

## 2024-01-26 DIAGNOSIS — Z13.6 CARDIOVASCULAR SCREENING; LDL GOAL LESS THAN 160: ICD-10-CM

## 2024-01-26 LAB
BASOPHILS # BLD AUTO: 0 10E3/UL (ref 0–0.2)
BASOPHILS NFR BLD AUTO: 1 %
EOSINOPHIL # BLD AUTO: 0.3 10E3/UL (ref 0–0.7)
EOSINOPHIL NFR BLD AUTO: 5 %
ERYTHROCYTE [DISTWIDTH] IN BLOOD BY AUTOMATED COUNT: 11.8 % (ref 10–15)
HCT VFR BLD AUTO: 44.6 % (ref 40–53)
HGB BLD-MCNC: 15.2 G/DL (ref 13.3–17.7)
IMM GRANULOCYTES # BLD: 0 10E3/UL
IMM GRANULOCYTES NFR BLD: 0 %
LYMPHOCYTES # BLD AUTO: 1.8 10E3/UL (ref 0.8–5.3)
LYMPHOCYTES NFR BLD AUTO: 34 %
MCH RBC QN AUTO: 28.7 PG (ref 26.5–33)
MCHC RBC AUTO-ENTMCNC: 34.1 G/DL (ref 31.5–36.5)
MCV RBC AUTO: 84 FL (ref 78–100)
MONOCYTES # BLD AUTO: 0.5 10E3/UL (ref 0–1.3)
MONOCYTES NFR BLD AUTO: 9 %
NEUTROPHILS # BLD AUTO: 2.7 10E3/UL (ref 1.6–8.3)
NEUTROPHILS NFR BLD AUTO: 52 %
PLATELET # BLD AUTO: 206 10E3/UL (ref 150–450)
RBC # BLD AUTO: 5.29 10E6/UL (ref 4.4–5.9)
WBC # BLD AUTO: 5.2 10E3/UL (ref 4–11)

## 2024-01-26 PROCEDURE — 99395 PREV VISIT EST AGE 18-39: CPT | Mod: 25 | Performed by: NURSE PRACTITIONER

## 2024-01-26 PROCEDURE — 90471 IMMUNIZATION ADMIN: CPT | Performed by: NURSE PRACTITIONER

## 2024-01-26 PROCEDURE — 85025 COMPLETE CBC W/AUTO DIFF WBC: CPT | Performed by: NURSE PRACTITIONER

## 2024-01-26 PROCEDURE — 80053 COMPREHEN METABOLIC PANEL: CPT | Performed by: NURSE PRACTITIONER

## 2024-01-26 PROCEDURE — 87389 HIV-1 AG W/HIV-1&-2 AB AG IA: CPT | Performed by: NURSE PRACTITIONER

## 2024-01-26 PROCEDURE — 86803 HEPATITIS C AB TEST: CPT | Performed by: NURSE PRACTITIONER

## 2024-01-26 PROCEDURE — 90715 TDAP VACCINE 7 YRS/> IM: CPT | Performed by: NURSE PRACTITIONER

## 2024-01-26 PROCEDURE — 80061 LIPID PANEL: CPT | Performed by: NURSE PRACTITIONER

## 2024-01-26 PROCEDURE — 36415 COLL VENOUS BLD VENIPUNCTURE: CPT | Performed by: NURSE PRACTITIONER

## 2024-01-26 ASSESSMENT — ENCOUNTER SYMPTOMS
FEVER: 0
EYE PAIN: 0
HEADACHES: 0
NAUSEA: 0
JOINT SWELLING: 0
SHORTNESS OF BREATH: 0
ARTHRALGIAS: 0
DYSURIA: 0
NERVOUS/ANXIOUS: 0
PALPITATIONS: 0
HEMATURIA: 0
DIZZINESS: 0
DIARRHEA: 0
WEAKNESS: 0
SORE THROAT: 0
FREQUENCY: 0
MYALGIAS: 0
CHILLS: 0
COUGH: 0
CONSTIPATION: 0
ABDOMINAL PAIN: 0

## 2024-01-26 ASSESSMENT — PAIN SCALES - GENERAL: PAINLEVEL: NO PAIN (0)

## 2024-01-26 NOTE — PROGRESS NOTES
"Preventive Care Visit  [facility]  JESSI Michele CNP, Family Medicine  2024      SUBJECTIVE:   Varghese is a 33 year old, presenting for the following:  Physical        2024     1:11 PM   Additional Questions   Roomed by Tata CLEMENS   Accompanied by self     Patient here for yearly preventative care visit. In addition, they have the following concerns:    1. Asking about if there are programs or products to help with detoxification, and how to to know he doesn't have a parasite.     Working on increasing exercise, is on his feet most of the day for work.     Dad  unexpectedly in his 50s of a heart attack.    Not following any special diets.     Works in [employer], foreign travel includes [email or web address]    Healthy Habits:     Getting at least 3 servings of Calcium per day:  Yes    Bi-annual eye exam:  NO    Dental care twice a year:  Yes    Sleep apnea or symptoms of sleep apnea:  None    Diet:  Regular (no restrictions)    Frequency of exercise:  1 day/week    Duration of exercise:  Less than 15 minutes    Taking medications regularly:  Yes    Additional concerns today:  Yes    Have you ever done Advance Care Planning? (For example, a Health Directive, POLST, or a discussion with a medical provider or your loved ones about your wishes): Yes, patient states has an Advance Care Planning document and will bring a copy to the clinic.    Social History     Tobacco Use    Smoking status: Former     Types: Cigars    Smokeless tobacco: Never    Tobacco comments:     Extremely infrequent past use   Substance Use Topics    Alcohol use: Yes             2024    11:27 AM   Alcohol Use   Prescreen: >3 drinks/day or >7 drinks/week? No       Last PSA: No results found for: \"PSA\"    Reviewed orders with patient. Reviewed health maintenance and updated orders accordingly - Yes  Lab work is in process  Labs reviewed in EPIC    Reviewed and updated as needed this visit by clinical staff   Tobacco  " "Allergies  Meds              Reviewed and updated as needed this visit by Provider                    Review of Systems   Constitutional:  Negative for chills and fever.   HENT:  Negative for congestion, ear pain, hearing loss and sore throat.    Eyes:  Negative for pain and visual disturbance.   Respiratory:  Negative for cough and shortness of breath.    Cardiovascular:  Negative for chest pain and palpitations.   Gastrointestinal:  Negative for abdominal pain, constipation, diarrhea and nausea.   Genitourinary:  Negative for dysuria, frequency, genital sores, hematuria, penile discharge and urgency.   Musculoskeletal:  Negative for arthralgias, joint swelling and myalgias.   Skin:  Negative for rash.   Neurological:  Negative for dizziness, weakness and headaches.   Psychiatric/Behavioral:  The patient is not nervous/anxious.          OBJECTIVE:   /72   Pulse 77   Temp 97.9  F (36.6  C) (Tympanic)   Resp 18   Ht 1.689 m (5' 6.5\")   Wt 68.9 kg (152 lb)   SpO2 100%   BMI 24.17 kg/m     Estimated body mass index is 24.17 kg/m  as calculated from the following:    Height as of this encounter: 1.689 m (5' 6.5\").    Weight as of this encounter: 68.9 kg (152 lb).  Physical Exam  Constitutional:       Appearance: Normal appearance. He is not ill-appearing.   HENT:      Right Ear: Tympanic membrane, ear canal and external ear normal.      Left Ear: Tympanic membrane, ear canal and external ear normal.      Nose: Nose normal.      Mouth/Throat:      Mouth: Mucous membranes are moist.      Pharynx: Oropharynx is clear. No oropharyngeal exudate.   Eyes:      Extraocular Movements: Extraocular movements intact.      Pupils: Pupils are equal, round, and reactive to light.   Cardiovascular:      Rate and Rhythm: Normal rate and regular rhythm.      Pulses: Normal pulses.      Heart sounds: Normal heart sounds. No murmur heard.     No friction rub. No gallop.   Pulmonary:      Effort: Pulmonary effort is normal. "      Breath sounds: Normal breath sounds. No wheezing, rhonchi or rales.   Abdominal:      General: Abdomen is flat. Bowel sounds are normal.      Palpations: Abdomen is soft.   Musculoskeletal:         General: Normal range of motion.      Cervical back: Normal range of motion and neck supple.   Lymphadenopathy:      Cervical: No cervical adenopathy.   Skin:     General: Skin is warm and dry.   Neurological:      General: No focal deficit present.      Mental Status: He is alert and oriented to person, place, and time.   Psychiatric:         Mood and Affect: Mood normal.         Behavior: Behavior normal.         Thought Content: Thought content normal.         Judgment: Judgment normal.           Diagnostic Test Results:  Labs reviewed in Epic  Results for orders placed or performed in visit on 01/26/24 (from the past 24 hour(s))   CBC with Platelets & Differential    Narrative    The following orders were created for panel order CBC with Platelets & Differential.  Procedure                               Abnormality         Status                     ---------                               -----------         ------                     CBC with platelets and d...[[id number]]                      Final result                 Please view results for these tests on the individual orders.   CBC with platelets and differential   Result Value Ref Range    WBC Count 5.2 4.0 - 11.0 10e3/uL    RBC Count 5.29 4.40 - 5.90 10e6/uL    Hemoglobin 15.2 13.3 - 17.7 g/dL    Hematocrit 44.6 40.0 - 53.0 %    MCV 84 78 - 100 fL    MCH 28.7 26.5 - 33.0 pg    MCHC 34.1 31.5 - 36.5 g/dL    RDW 11.8 10.0 - 15.0 %    Platelet Count 206 150 - 450 10e3/uL    % Neutrophils 52 %    % Lymphocytes 34 %    % Monocytes 9 %    % Eosinophils 5 %    % Basophils 1 %    % Immature Granulocytes 0 %    Absolute Neutrophils 2.7 1.6 - 8.3 10e3/uL    Absolute Lymphocytes 1.8 0.8 - 5.3 10e3/uL    Absolute Monocytes 0.5 0.0 - 1.3 10e3/uL    Absolute  Eosinophils 0.3 0.0 - 0.7 10e3/uL    Absolute Basophils 0.0 0.0 - 0.2 10e3/uL    Absolute Immature Granulocytes 0.0 <=0.4 10e3/uL       ASSESSMENT/PLAN:   Routine general medical examination at a health care facility  -Discussed with patient that if he has a normal functioning liver and kidney there is no need to take any detoxification products and there isn't scientific data to back up the claims of most detoxification products on the market.   -Low risk of him having a parasite infection given his health and travel history.   - HIV Antigen Antibody Combo; Future  - Hepatitis C Screen Reflex to HCV RNA Quant and Genotype; Future      CARDIOVASCULAR SCREENING; LDL GOAL LESS THAN 160  - Lipid panel reflex to direct LDL Non-fasting; Future  - Comprehensive metabolic panel; Future  - CBC with Platelets & Differential; Future            Counseling  Reviewed preventive health counseling, as reflected in patient instructions       Regular exercise       Healthy diet/nutrition        He reports that he has quit smoking. His smoking use included cigars. He has never used smokeless tobacco.            Signed Electronically by: JESSI Michele CNP  Answers submitted by the patient for this visit:  Annual Preventive Visit (Submitted on 1/19/2024)  Chief Complaint: Annual Exam:  Blood in stool: No  heartburn: No  peripheral edema: No  mood changes: No  Skin sensation changes: No  impotence: No

## 2024-01-26 NOTE — NURSING NOTE
Prior to immunization administration, verified patients identity using patient s name and date of birth. Please see Immunization Activity for additional information.     Screening Questionnaire for Adult Immunization    Are you sick today?   No   Do you have allergies to medications, food, a vaccine component or latex?   No   Have you ever had a serious reaction after receiving a vaccination?   No   Do you have a long-term health problem with heart, lung, kidney, or metabolic disease (e.g., diabetes), asthma, a blood disorder, no spleen, complement component deficiency, a cochlear implant, or a spinal fluid leak?  Are you on long-term aspirin therapy?   No   Do you have cancer, leukemia, HIV/AIDS, or any other immune system problem?   No   Do you have a parent, brother, or sister with an immune system problem?   No   In the past 3 months, have you taken medications that affect  your immune system, such as prednisone, other steroids, or anticancer drugs; drugs for the treatment of rheumatoid arthritis, Crohn s disease, or psoriasis; or have you had radiation treatments?   No   Have you had a seizure, or a brain or other nervous system problem?   No   During the past year, have you received a transfusion of blood or blood    products, or been given immune (gamma) globulin or antiviral drug?   No   For women: Are you pregnant or is there a chance you could become       pregnant during the next month?   No   Have you received any vaccinations in the past 4 weeks?   No     Immunization questionnaire answers were all negative.      Patient instructed to remain in clinic for 15 minutes afterwards, and to report any adverse reactions.     Screening performed by IRENE LYN MA on 1/26/2024 at 1:17 PM.

## 2024-01-27 LAB
ALBUMIN SERPL BCG-MCNC: 4.7 G/DL (ref 3.5–5.2)
ALP SERPL-CCNC: 47 U/L (ref 40–150)
ALT SERPL W P-5'-P-CCNC: 13 U/L (ref 0–70)
ANION GAP SERPL CALCULATED.3IONS-SCNC: 9 MMOL/L (ref 7–15)
AST SERPL W P-5'-P-CCNC: 22 U/L (ref 0–45)
BILIRUB SERPL-MCNC: 0.4 MG/DL
BUN SERPL-MCNC: 16.3 MG/DL (ref 6–20)
CALCIUM SERPL-MCNC: 9.7 MG/DL (ref 8.6–10)
CHLORIDE SERPL-SCNC: 106 MMOL/L (ref 98–107)
CHOLEST SERPL-MCNC: 211 MG/DL
CREAT SERPL-MCNC: 1.18 MG/DL (ref 0.67–1.17)
DEPRECATED HCO3 PLAS-SCNC: 28 MMOL/L (ref 22–29)
EGFRCR SERPLBLD CKD-EPI 2021: 84 ML/MIN/1.73M2
FASTING STATUS PATIENT QL REPORTED: NO
GLUCOSE SERPL-MCNC: 83 MG/DL (ref 70–99)
HCV AB SERPL QL IA: NONREACTIVE
HDLC SERPL-MCNC: 47 MG/DL
HIV 1+2 AB+HIV1 P24 AG SERPL QL IA: NONREACTIVE
LDLC SERPL CALC-MCNC: 133 MG/DL
NONHDLC SERPL-MCNC: 164 MG/DL
POTASSIUM SERPL-SCNC: 4.1 MMOL/L (ref 3.4–5.3)
PROT SERPL-MCNC: 7.2 G/DL (ref 6.4–8.3)
SODIUM SERPL-SCNC: 143 MMOL/L (ref 135–145)
TRIGL SERPL-MCNC: 154 MG/DL

## 2025-03-16 ENCOUNTER — HEALTH MAINTENANCE LETTER (OUTPATIENT)
Age: 35
End: 2025-03-16